# Patient Record
Sex: FEMALE | Race: BLACK OR AFRICAN AMERICAN | NOT HISPANIC OR LATINO | URBAN - METROPOLITAN AREA
[De-identification: names, ages, dates, MRNs, and addresses within clinical notes are randomized per-mention and may not be internally consistent; named-entity substitution may affect disease eponyms.]

---

## 2022-07-09 ENCOUNTER — TELEPHONE ENCOUNTER (OUTPATIENT)
Dept: URBAN - METROPOLITAN AREA CLINIC 121 | Facility: CLINIC | Age: 77
End: 2022-07-09

## 2022-07-09 RX ORDER — INSULIN ASPART 100 [IU]/ML
INJECTION, SOLUTION INTRAVENOUS; SUBCUTANEOUS TAKE AS DIRECTED
Refills: 0 | OUTPATIENT
Start: 2018-01-11 | End: 2018-03-08

## 2022-07-09 RX ORDER — LISINOPRIL 10 MG/1
TABLET ORAL ONCE A DAY
Refills: 0 | OUTPATIENT
Start: 2018-01-11 | End: 2018-03-08

## 2022-07-09 RX ORDER — SITAGLIPTIN PHOSPHATE 100 MG
TABLET ORAL
Refills: 0 | OUTPATIENT
Start: 2018-01-11 | End: 2018-03-08

## 2022-07-09 RX ORDER — ENALAPRIL MALEATE 20 MG/1
TABLET ORAL
Refills: 0 | OUTPATIENT
Start: 2010-08-25 | End: 2018-01-11

## 2022-07-09 RX ORDER — ENALAPRIL MALEATE 20 MG/1
TABLET ORAL ONCE A DAY
Refills: 0 | OUTPATIENT
Start: 2018-01-11 | End: 2018-01-11

## 2022-07-09 RX ORDER — ROSUVASTATIN CALCIUM 40 MG/1
TABLET, FILM COATED ORAL
Refills: 0 | OUTPATIENT
Start: 2010-08-25 | End: 2018-01-11

## 2022-07-09 RX ORDER — CARVEDILOL 12.5 MG/1
TABLET, FILM COATED ORAL TAKE AS DIRECTED
Refills: 0 | OUTPATIENT
Start: 2018-01-11 | End: 2018-03-08

## 2022-07-09 RX ORDER — SITAGLIPTIN PHOSPHATE 100 MG
TABLET ORAL
Refills: 0 | OUTPATIENT
Start: 2010-08-25 | End: 2018-01-11

## 2022-07-09 RX ORDER — HYDROCHLOROTHIAZIDE 12.5 MG/1
TABLET ORAL
Refills: 0 | OUTPATIENT
Start: 2010-08-25 | End: 2018-01-11

## 2022-07-09 RX ORDER — INSULIN LISPRO 100 [IU]/ML
INJECTION, SUSPENSION SUBCUTANEOUS
Refills: 0 | OUTPATIENT
Start: 2010-08-25 | End: 2018-01-11

## 2022-07-09 RX ORDER — ROSUVASTATIN CALCIUM 40 MG/1
TABLET, FILM COATED ORAL ONCE A DAY
Refills: 0 | OUTPATIENT
Start: 2018-01-11 | End: 2018-03-08

## 2022-07-09 RX ORDER — PREGABALIN 100 MG
CAPSULE ORAL TAKE AS DIRECTED
Refills: 0 | OUTPATIENT
Start: 2018-01-11 | End: 2018-03-08

## 2022-07-10 ENCOUNTER — TELEPHONE ENCOUNTER (OUTPATIENT)
Dept: URBAN - METROPOLITAN AREA CLINIC 121 | Facility: CLINIC | Age: 77
End: 2022-07-10

## 2022-07-10 RX ORDER — AMOXICILLIN 500 MG/1
TABLET, FILM COATED ORAL
Refills: 0 | Status: ACTIVE | COMMUNITY
Start: 2010-09-29

## 2022-07-10 RX ORDER — PREGABALIN 100 MG
CAPSULE ORAL TAKE AS DIRECTED
Refills: 0 | Status: ACTIVE | COMMUNITY
Start: 2018-03-08

## 2022-07-10 RX ORDER — CARVEDILOL 12.5 MG/1
TABLET, FILM COATED ORAL TAKE AS DIRECTED
Refills: 0 | Status: ACTIVE | COMMUNITY
Start: 2018-03-08

## 2022-07-10 RX ORDER — SITAGLIPTIN PHOSPHATE 100 MG
TABLET ORAL
Refills: 0 | Status: ACTIVE | COMMUNITY
Start: 2018-03-08

## 2022-07-10 RX ORDER — INSULIN ASPART 100 [IU]/ML
INJECTION, SOLUTION INTRAVENOUS; SUBCUTANEOUS TAKE AS DIRECTED
Refills: 0 | Status: ACTIVE | COMMUNITY
Start: 2018-03-08

## 2022-07-10 RX ORDER — ROSUVASTATIN CALCIUM 40 MG/1
TABLET, FILM COATED ORAL ONCE A DAY
Refills: 0 | Status: ACTIVE | COMMUNITY
Start: 2018-03-08

## 2022-07-10 RX ORDER — LISINOPRIL 10 MG/1
TABLET ORAL ONCE A DAY
Refills: 0 | Status: ACTIVE | COMMUNITY
Start: 2018-03-08

## 2024-01-18 ENCOUNTER — DASHBOARD ENCOUNTERS (OUTPATIENT)
Age: 79
End: 2024-01-18

## 2024-01-18 ENCOUNTER — OFFICE VISIT (OUTPATIENT)
Dept: URBAN - METROPOLITAN AREA CLINIC 63 | Facility: CLINIC | Age: 79
End: 2024-01-18
Payer: MEDICARE

## 2024-01-18 VITALS
SYSTOLIC BLOOD PRESSURE: 150 MMHG | HEIGHT: 66 IN | DIASTOLIC BLOOD PRESSURE: 82 MMHG | BODY MASS INDEX: 33.27 KG/M2 | TEMPERATURE: 97.1 F | OXYGEN SATURATION: 99 % | WEIGHT: 207 LBS | HEART RATE: 65 BPM

## 2024-01-18 DIAGNOSIS — Z86.010 PERSONAL HISTORY OF COLONIC POLYPS: ICD-10-CM

## 2024-01-18 DIAGNOSIS — E55.9 VITAMIN D DEFICIENCY: ICD-10-CM

## 2024-01-18 PROBLEM — 34713006: Status: ACTIVE | Noted: 2024-01-18

## 2024-01-18 PROBLEM — 428283002: Status: ACTIVE | Noted: 2024-01-18

## 2024-01-18 PROCEDURE — 99203 OFFICE O/P NEW LOW 30 MIN: CPT | Performed by: INTERNAL MEDICINE

## 2024-01-18 RX ORDER — PREGABALIN 100 MG
CAPSULE ORAL TAKE AS DIRECTED
Refills: 0 | Status: ACTIVE | COMMUNITY
Start: 2018-03-08

## 2024-01-18 RX ORDER — INSULIN ASPART 100 [IU]/ML
INJECTION, SOLUTION INTRAVENOUS; SUBCUTANEOUS TAKE AS DIRECTED
Refills: 0 | Status: ACTIVE | COMMUNITY
Start: 2018-03-08

## 2024-01-18 RX ORDER — ROSUVASTATIN CALCIUM 40 MG/1
TABLET, FILM COATED ORAL ONCE A DAY
Refills: 0 | Status: ACTIVE | COMMUNITY
Start: 2018-03-08

## 2024-01-18 RX ORDER — POLYETHYLENE GLYCOL 3350, SODIUM CHLORIDE, SODIUM BICARBONATE, POTASSIUM CHLORIDE 420; 11.2; 5.72; 1.48 G/4L; G/4L; G/4L; G/4L
AS DIRECTED POWDER, FOR SOLUTION ORAL ONCE
Qty: 4000 | Refills: 0 | OUTPATIENT
Start: 2024-01-18 | End: 2024-01-19

## 2024-01-18 RX ORDER — SITAGLIPTIN PHOSPHATE 100 MG
TABLET ORAL
Refills: 0 | Status: ACTIVE | COMMUNITY
Start: 2018-03-08

## 2024-01-18 RX ORDER — CARVEDILOL 12.5 MG/1
TABLET, FILM COATED ORAL TAKE AS DIRECTED
Refills: 0 | Status: ACTIVE | COMMUNITY
Start: 2018-03-08

## 2024-01-18 RX ORDER — FAMOTIDINE 20 MG/1
1 TABLET AT BEDTIME TABLET, FILM COATED ORAL ONCE
Status: ACTIVE | COMMUNITY

## 2024-01-18 RX ORDER — LISINOPRIL 10 MG/1
TABLET ORAL ONCE A DAY
Refills: 0 | Status: ACTIVE | COMMUNITY
Start: 2018-03-08

## 2024-01-18 RX ORDER — ONDANSETRON HYDROCHLORIDE 4 MG/1
1 TABLET TABLET, FILM COATED ORAL
Qty: 2 | Refills: 0 | OUTPATIENT
Start: 2024-01-18

## 2024-01-18 NOTE — HPI-PREVIOUS PROCEDURES
Colonoscopy 2018: Normal terminal ileum, 7 mm splenic flexure tubular adenoma, grade 2 hemorrhoids, stool present in the entire colon  Upper endoscopy 2012: H. pylori positive moderate gastritis, unremarkable duodenal biopsies H. pylori successfully treated with eradication confirmed on urea breath test  Colonoscopy 2012: 5 mm transverse colon adenoma

## 2024-01-18 NOTE — HPI-PREVIOUS LABS
Labs September 2023: WBC 6.7, hemoglobin 12 g (normal), MCV 81, platelets 179, Serum iron 58, TIBC 326, 18% saturation, ferritin 81, BUN 34, creatinine 1.53 (H), normal electrolytes, serum albumin 4.2, Vitamin D 23 (L).

## 2024-01-18 NOTE — HPI-TODAY'S VISIT:
Mrs. Smith is a pleasant 78-year-old female who has a history of colon polyps, H. pylori positive gastritis successfully treated, who is here for surveillance colonoscopy. She has a history of colon polyps and an inadequate prep that required aggressive irrigation back in 2018.Lesions could have been missed. She is asymptomatic. Depending on her diet she has occasional indigestion. But otherwise denies dysphagia early satiety bloating abdominal pain nausea. Denies any constipation diarrhea rectal bleeding melena or any unintentional weight loss loss of appetite.

## 2024-01-25 ENCOUNTER — LAB OUTSIDE AN ENCOUNTER (OUTPATIENT)
Dept: URBAN - METROPOLITAN AREA CLINIC 63 | Facility: CLINIC | Age: 79
End: 2024-01-25

## 2024-02-20 ENCOUNTER — LAB (OUTPATIENT)
Dept: URBAN - METROPOLITAN AREA CLINIC 4 | Facility: CLINIC | Age: 79
End: 2024-02-20
Payer: MEDICARE

## 2024-02-20 ENCOUNTER — COLON (OUTPATIENT)
Dept: URBAN - METROPOLITAN AREA SURGERY CENTER 4 | Facility: SURGERY CENTER | Age: 79
End: 2024-02-20
Payer: MEDICARE

## 2024-02-20 DIAGNOSIS — K63.89 OTHER SPECIFIED DISEASES OF INTESTINE: ICD-10-CM

## 2024-02-20 DIAGNOSIS — Z12.11 ENCOUNTER FOR SCREENING FOR MALIGNANT NEOPLASM OF COLON: ICD-10-CM

## 2024-02-20 DIAGNOSIS — K57.30 DIVERTICULOSIS OF LARGE INTESTINE WITHOUT PERFORATION OR ABSCESS WITHOUT BLEEDING: ICD-10-CM

## 2024-02-20 DIAGNOSIS — K63.5 POLYP OF SIGMOID COLON, UNSPECIFIED TYPE: ICD-10-CM

## 2024-02-20 DIAGNOSIS — K64.1 SECOND DEGREE HEMORRHOIDS: ICD-10-CM

## 2024-02-20 PROCEDURE — 45380 COLONOSCOPY AND BIOPSY: CPT | Performed by: INTERNAL MEDICINE

## 2024-02-20 PROCEDURE — 88305 TISSUE EXAM BY PATHOLOGIST: CPT | Performed by: PATHOLOGY

## 2024-02-20 RX ORDER — INSULIN ASPART 100 [IU]/ML
INJECTION, SOLUTION INTRAVENOUS; SUBCUTANEOUS TAKE AS DIRECTED
Refills: 0 | Status: ACTIVE | COMMUNITY
Start: 2018-03-08

## 2024-02-20 RX ORDER — FAMOTIDINE 20 MG/1
1 TABLET AT BEDTIME TABLET, FILM COATED ORAL ONCE
Status: ACTIVE | COMMUNITY

## 2024-02-20 RX ORDER — PREGABALIN 100 MG
CAPSULE ORAL TAKE AS DIRECTED
Refills: 0 | Status: ACTIVE | COMMUNITY
Start: 2018-03-08

## 2024-02-20 RX ORDER — CARVEDILOL 12.5 MG/1
TABLET, FILM COATED ORAL TAKE AS DIRECTED
Refills: 0 | Status: ACTIVE | COMMUNITY
Start: 2018-03-08

## 2024-02-20 RX ORDER — ROSUVASTATIN CALCIUM 40 MG/1
TABLET, FILM COATED ORAL ONCE A DAY
Refills: 0 | Status: ACTIVE | COMMUNITY
Start: 2018-03-08

## 2024-02-20 RX ORDER — SITAGLIPTIN PHOSPHATE 100 MG
TABLET ORAL
Refills: 0 | Status: ACTIVE | COMMUNITY
Start: 2018-03-08

## 2024-02-20 RX ORDER — ONDANSETRON HYDROCHLORIDE 4 MG/1
1 TABLET TABLET, FILM COATED ORAL
Qty: 2 | Refills: 0 | Status: ACTIVE | COMMUNITY
Start: 2024-01-18

## 2024-02-20 RX ORDER — LISINOPRIL 10 MG/1
TABLET ORAL ONCE A DAY
Refills: 0 | Status: ACTIVE | COMMUNITY
Start: 2018-03-08

## 2024-03-06 ENCOUNTER — OV EP (OUTPATIENT)
Dept: URBAN - METROPOLITAN AREA CLINIC 63 | Facility: CLINIC | Age: 79
End: 2024-03-06

## 2024-04-05 ENCOUNTER — OV EP (OUTPATIENT)
Dept: URBAN - METROPOLITAN AREA CLINIC 63 | Facility: CLINIC | Age: 79
End: 2024-04-05

## 2024-04-05 RX ORDER — LISINOPRIL 10 MG/1
TABLET ORAL ONCE A DAY
Refills: 0 | Status: ACTIVE | COMMUNITY
Start: 2018-03-08

## 2024-04-05 RX ORDER — ONDANSETRON HYDROCHLORIDE 4 MG/1
1 TABLET TABLET, FILM COATED ORAL
Qty: 2 | Refills: 0 | Status: ACTIVE | COMMUNITY
Start: 2024-01-18

## 2024-04-05 RX ORDER — SITAGLIPTIN PHOSPHATE 100 MG
TABLET ORAL
Refills: 0 | Status: ACTIVE | COMMUNITY
Start: 2018-03-08

## 2024-04-05 RX ORDER — PREGABALIN 100 MG
CAPSULE ORAL TAKE AS DIRECTED
Refills: 0 | Status: ACTIVE | COMMUNITY
Start: 2018-03-08

## 2024-04-05 RX ORDER — CARVEDILOL 12.5 MG/1
TABLET, FILM COATED ORAL TAKE AS DIRECTED
Refills: 0 | Status: ACTIVE | COMMUNITY
Start: 2018-03-08

## 2024-04-05 RX ORDER — INSULIN ASPART 100 [IU]/ML
INJECTION, SOLUTION INTRAVENOUS; SUBCUTANEOUS TAKE AS DIRECTED
Refills: 0 | Status: ACTIVE | COMMUNITY
Start: 2018-03-08

## 2024-04-05 RX ORDER — ROSUVASTATIN CALCIUM 40 MG/1
TABLET, FILM COATED ORAL ONCE A DAY
Refills: 0 | Status: ACTIVE | COMMUNITY
Start: 2018-03-08

## 2024-04-05 RX ORDER — FAMOTIDINE 20 MG/1
1 TABLET AT BEDTIME TABLET, FILM COATED ORAL ONCE
Status: ACTIVE | COMMUNITY

## 2024-04-05 NOTE — HPI-PREVIOUS PROCEDURES
2/20/2024 colonoscopy consistent with nonthrombosed external and internal hemorrhoids (grade 2) One 3 mm polyp on sigmoid colon consistent with hyperplastic polyp. Diverticulosis in rectosigmoid colon and sigmoid colon Examination otherwise within normal limits Recall for 5 years due to personal history of colon polyps Colonoscopy 2018: Normal terminal ileum, 7 mm splenic flexure tubular adenoma, grade 2 hemorrhoids, stool present in the entire colon  Upper endoscopy 2012: H. pylori positive moderate gastritis, unremarkable duodenal biopsies H. pylori successfully treated with eradication confirmed on urea breath test  Colonoscopy 2012: 5 mm transverse colon adenoma

## 2024-04-05 NOTE — HPI-ZZZTODAY'S VISIT
Betty is a very pleasant 79-year-old female who presents for colonoscopy follow-up.  Past medical history significant for personal history of colon polyps, vitamin D deficiency, hypertension, hyperlipidemia, diabetes mellitus, migraines, GERD, renal insufficiency, H. pylori positive gastritis successfully treated.  Past surgical history significant for tubal ligation.  Last colonoscopy 2/20/2024.  Last EGD 2012.  Denies a family history of colon polyps or GI malignancy. At her previous office visit she was asymptomatic from a GI perspective.